# Patient Record
Sex: MALE | Race: WHITE | Employment: STUDENT | URBAN - METROPOLITAN AREA
[De-identification: names, ages, dates, MRNs, and addresses within clinical notes are randomized per-mention and may not be internally consistent; named-entity substitution may affect disease eponyms.]

---

## 2018-04-12 ENCOUNTER — TELEPHONE (OUTPATIENT)
Dept: OTOLARYNGOLOGY | Facility: CLINIC | Age: 23
End: 2018-04-12

## 2018-04-12 NOTE — TELEPHONE ENCOUNTER
FUTURE VISIT INFORMATION      FUTURE VISIT INFORMATION:    Date: 05/31/2018    Time: 8:30    Location: [facility] [place]  REFERRAL INFORMATION:    Referring provider:  N/A    Referring providers clinic:  N/A    Reason for visit/diagnosis  SPEECH THERAPY SESSIONS        RECORDS STATUS    ALL RECORDS IN [facility] SEEN DD IN THE PAST 2014    OFFICE VISIT: 12/26/2014, 12/23/2014, 12/18/2014 & 11/24/2014

## 2018-04-19 ENCOUNTER — PRE VISIT (OUTPATIENT)
Dept: OTOLARYNGOLOGY | Facility: CLINIC | Age: 23
End: 2018-04-19

## 2018-04-19 NOTE — TELEPHONE ENCOUNTER
FUTURE VISIT INFORMATION      FUTURE VISIT INFORMATION:    Date: 5/31/18    Time: 8:30am    Location: [facility] [place]  REFERRAL INFORMATION:    Referring provider:  None- previous Pt    Referring providers clinic:  [facility]    Reason for visit/diagnosis  requesting follow up from 2014    RECORDS REQUESTED FROM:       Clinic name Comments Records Status Imaging Status   [facility] Records are internal Internal                                    RECORDS STATUS

## 2018-05-31 ENCOUNTER — OFFICE VISIT (OUTPATIENT)
Dept: OTOLARYNGOLOGY | Facility: CLINIC | Age: 23
End: 2018-05-31
Payer: COMMERCIAL

## 2018-05-31 ENCOUNTER — PRE VISIT (OUTPATIENT)
Dept: OTOLARYNGOLOGY | Facility: CLINIC | Age: 23
End: 2018-05-31

## 2018-05-31 DIAGNOSIS — R49.0 DYSPHONIA: Primary | ICD-10-CM

## 2018-05-31 NOTE — PROGRESS NOTES
[facility] VOICE CLINIC  Jose Zapata Jr., M.D., F.A.C.S.  Kristi Maguire M.D., M.P.H.  Bailey Stockton, Ph.D., CCC/SLP  Antoinette Hernandez M.M. (voice), MRODNEY., CCC/SLP  Alistair Schultz M.M. (voice), MRODNEY., [facility]/SLP    [facility] VOICE [facility]  VOICE/SPEECH/BREATHING EVALUATION AND LARYNGEAL EXAMINATION REPORT    Patient: Farshad Taveras  Date of Visit: 5/31/2018    HISTORY  Farshad Taveras was seen for voice evaluation, laryngeal examination and treatment today.  He was last seen in December of 2014. Salient details of his history are as follows:    Mr. Taveras is a 22 year old University student with a history of dysphonia.    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 [employer]    Short course of therapy in December 2014 while on break from school    Sessions were helpful during the session, but it was difficult to maintain the practice and vigilance while he was at school    Some improvement at school, but also didn't notice as much, because he was becoming dissatisfied with his school program; left school later that spring    Day to day strain on voice was reduced when he left music school, so he thought he was getting better    Now using voice differently, but more problematic    He is playing trumpet much less, but there is actually more demand on his voice as 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     His school work also involves a great deal of discussion and giving of presentations    Feels that he has forgotten what it was like to talk normally, and now realizes that the voice problem is still a constant issue    Not particularly hoarse, but strain with voice use continues throughout the day; he finds he needs to reduce his voice use by the end of the day because of discomfort    Lack of control of voice; quality is unstable and irregular; never aphonic; rarely normal    Also throat-clearing, globus, throat muscle  "discomfort    Clicking sound/sensation on swallow    Throat and swallow symptoms are related to voice use, and covary with voice use    DIAGNOSIS/REASON FOR REFERRAL  Dysphonia/ Evaluate, perform laryngeal exam, treat as appropriate    Patient Supplied Answers To [facility] Intake General Questionnaire  [facility] Intake - General Form Review 5/31/2018   Are you having any of these symptoms? Increased effort to talk/sing, Throat irritation, Throat tightness, Pain/ache in throat, Mucus in throat, Frequent throat-clearing, Poor voice quality   Are you having any of these symptoms? Voice tires easily, Voice breaks, Change in vocal pitch, Change in vocal volume, Change in range   Do you use caffeine? Yes   If you do use caffeine, how many oz. do you typically drink in a day? 16   How many oz. of non-caffeinated fluid do you typically drink in a day? 32   Have reflux medications been recommended to you? No   If yes, are you taking them regularly? N/A   Voice: Yes   Swallowing: Yes   Cough and/or throat-clearing: Yes   Breathing problem: No   Throat discomfort and/or the feeling of a lump in your throat: Yes   A different problem, not listed above: No   Other physicians/health care providers who should receive a copy of today's note (please provide first and last name(s), city): Mariella Caraballo       Patient Supplied Answers To VHI Questionnaire  Voice Handicap Index (VHI-10) 5/31/2018   My voice makes it difficult for people to hear me 2   People have difficulty understanding me in a noisy room 2   My voice difficulties restrict my personal and social life.  3   I feel left out of conversations because of my voice 2   My voice problem causes me to lose income 1   I feel as though I have to strain to produce voice 2   The clarity of my voice is unpredictable 2   My voice problem upsets me 3   My voice makes me feel handicapped 3   People ask, \"What's wrong with your voice?\" 0   VHI-10 20       Patient Supplied Answers To " [facility] Intake Cough/Throat-Clearing Questionnaire  [facility] Intake - Cough/Throat-Clearing Review 5/31/2018   How long have you had this cough/throat-clearing problem? 3 yrs   Was there anything unusual about the time this cough/throat-clearing problem was first noticed (such as illness, accident, surgery, etc)? If yes, please describe.  You have 200 characters to respond.  We will ask for more detail at your visit. walking pneumonia   What do you think caused this cough/throat-clearing problem? same as voice   How quickly did the cough/throat-clearing problem develop? Gradually   How do the cough/throat-clearing symptoms vary? Worse in the AM, Worse after heavy voice use, Worse with stress, Worse with exertion, Most of the time   Is there a tickle in your throat prior to coughing or throat clearing? No   What % of the time do you feel like you can control the urge to cough? 75%   Do any of the following trigger your cough? Talking, Laughing, Stress   If you have other triggers for your cough or throat-clearing issue, please list them here. no   Did you receive any therapy or treatment for this cough/throat-clearing problem?  If so, please briefly describe. no   For your cough/throat-clearing problem, is there anything else you'd like to tell us? no       Patient Supplied Answers To CSI Questionnaire  Cough Severity Index (CSI) 11/24/2014   My cough is worse when I lie down 0   My coughing problem causes me to restrict my personal and social life 2   I tend to avoid places because of my cough problem 1   I feel embarrassed because of my coughing problem 2   People ask, ''What's wrong?'' because I cough a lot 2   I run out of air when I cough 0   My coughing problem affects my voice 2   My coughing problem limits my physical activity 3   My coughing problem upsets me 3   People ask me if I am sick because I cough a lot 3   CSI Score 18       Patient Supplied Answers To [facility] Intake Throat Discomfort Questionnaire  [facility]  "Intake - Throat Discomfort Review 5/31/2018   How long have you had this throat discomfort and/or lump in the throat problem? 3 yrs   Did anything unusual happen about the time the throat discomfort and/or lump in the throat problem was first noticed (such as illness, accident, surgery, etc.)? If yes, please describe.  You have 200 characters to respond.  We will ask for more detail at your visit. walking pneumonia   What do you think caused this throat discomfort and/or lump in the throat problem? walkibg pneumonia   How quickly did the throat discomfort and/or lump in your throat problem develop? Gradually   How do the throat discomfort and/or lump in the throat symptoms vary? Worse in the PM, Worse after heavy voice use, Worse wIth stress, Worse with exertion, Most of the time   Over time, how has the throat discomfort and/or lump in the throat problem changed? Same   Do you have throat pain? Yes   If you have throat pain, how does it feel? Aching   Do you have the feeling of a \"lump\" in your throat? No   How long do the throat discomfort and/or lump in the throat symptoms last? Always there   What health care providers have you seen for this throat discomfort and/or lump in your throat problem? Who and when? balwinder,3years ago   Did you receive any therapy or treatment for your throat discomfort and/or lump in the throat problem?  If so, please briefly describe. voice therapy   For your throat discomfort and/or lump in the throat problem, is there anything else you'd like to tell us? no       Patient Supplied Answers To [facility] Intake Swallow Questionnaire  [facility] Intake - Swallow Review 5/31/2018   How long have you had this swallowing problem? 3 yrs   How quickly did the swallowing problem develop? Gradually   Did any specific event(s) occur around the time the swallowing problem was first noticed (such as illness, accident, surgery, etc)?  If yes, please describe? You have 200 characters to respond.  We will ask " for more detail at your visit. same as voice   What do you think caused this swallowing problem? same as voice   Have you ever had a swallowing problem before?  If yes, when? no   Do you feel a lump in your throat? Yes, Sometimes   Please check all that apply when describing your swallowing issue: Worse after heavy voice use, Worse with stress   When you swallow, how often do things feel like they go down the wrong tube? Weekly   My swallowing issue is: Variable   Over time, how has the swallowing problem changed? Same   What is your current type of food intake? Regular   What is your current type of liquid intake? Regular - thin liquids   What health care providers have you seen for this swallowing problem?  Who and when? balwinder   Did you receive any therapy or treatment for this swallowing problem?  If so, please describe briefly. voicetherapy   Have you changed your diet because of your swallowing problem?  If yes, how? no   Have you had a prior swallow study? No   Have you had a pneumonia or other severe chest infection within the past 6 months? No   For your swallowing problem, is there anything else you'd like to tell us? no     Patient Supplied Answers To EAT Questionnaire  Eating Assessment Tool (EAT-10) 11/24/2014   My swallowing problem has caused me to lose weight 0   My swallowing problem interferes with my ability to go out for meals 0   Swallowing liquids takes extra effort 0   Swallowing solids takes extra effort 1   Swallowing pills takes extra effort 0   Swallowing is painful 1   The pleasure of eating is affected by my swallowing 0   When I swallow food sticks in my throat 1   I cough when I eat 0   Swallowing is stressful 3   EAT-10 6       CURRENT PATIENT COMPLAINTS    Primary: voice    Secondary: related concerns that covary with voice    Denies significant dyspnea, dysphagia    No sharp or sudden pain; it is a dull burning/aching    OTHER PERTINENT HISTORY    Depression for past three years,  "now \"getting on top of it\"    Otherwise healthy; no medications    OBJECTIVE FINDINGS  VOICE/ SPEECH/ NON-COMMUNICATIVE LARYNGEAL BEHAVIORS EVALUATION  An evaluation of the voice was accomplished today; salient features are as follows:    Palpation of the laryngeal area shows:    Firm musculature    Tenderness of the thyrohyoid area     Severely reduced thyrohyoid space     No additional closure of the thyrohyoid space is possible on phonation    Massage of the thyrohyoid area brings relief    Breathing pattern:    Good thoracic/abdominal muscle use pattern, but movement for inhalation seems restricted    No overt tension is evident.    Voice quality is characterized by    Clear, robust, resonant quality    Mildly to moderately pressed    \"valved,\" inadequate airflow    Habitual pitch is WNL at F#2 to G#2, it is unclear if this is optimal for him    He states this feels optimal, and that his pitch rises as he becomes more tense    Loudness is WNL and appropriate for the setting    Sustained vowels:     Comfortable pitch /a/: G2 - barely adequate breath; very mild roughness, especially at onset; slight instability    Comfortable pitch /i/: G2 - barely adequate breath; messier onset; slightly more unstable    A singing task shows voice quality is essentially consistent between speech and singing, with very mild roughness    Clearer in the higher pitches of Happy Birthday, around F#3    In a pitch glide task to determine pitch range, he demonstrates good technique for the task    Highest pitch: D5, falsetto    Lowest pitch: C#2    he states today is a typical voice day, but his voice can get worse with increased use    GLOBAL ASSESSMENT OF DYSPHONIA: 45 /100    CAPE-V Overall Severity:   12/100    LARYNGEAL EXAMINATION    Endoscopic laryngeal examination was performed by:  Bailey Stockton, Ph.D., CCC/SLP  Verbal informed consent was obtained and witnessed prior to this procedure.   Type of exam:   Flexible endoscopy " with chip-tip technology and stroboscopy, left nostril; topical anesthesia with 3% Lidocaine and 0.25% phenylephrine was applied.    This exam shows:    Laryngeal and Vocal Fold Mucosa    Essentially healthy laryngeal mucosa    mild presence of thickened white secretions on the vocal folds and throughout the laryngeal area    Vocal fold mucosa is   o mildly dry  o Very mildly erythematous or vascularized  o Otherwise within normal limits, with no lesions and straight vibratory margins    Neurological and Functional Integrity of the Larynx    Vocal folds are mobile and meet at midline; movement is brisk and symmetric; exam is neurologically normal     Slight fatigue and slightly resisted movement is evident during a task of 20 quickly repeated vowels    Elongation of the vocal folds for pitch increase is good    On phonation, glottic closure is pressed    Supraglottic Function and Therapy Probes    moderate four-way constriction of the supraglottic larynx during connected speech    Hyperadduction of the posterior glottis during phonation    Supraglottic function during singing is improved    Therapy probes show   o Reduction in supraglottic hyperfunction during tasks that involved word-initial labiodental fricatives with forward resonance maneuvers    Stroboscopy provided the following additional information:    The mucosal wave is within normal limits in periodicity, amplitude, symmetry, and phase  o Closed phase is long and predominant, with very brisk opening and closing  o Less pressed glottis (closed phase) with therapy probes    I reviewed this laryngeal exam with Mr. Taveras today, and I provided pertinent explanations, as well as written information:    Endoscopic findings are consistent with audio-perceptual assessment and patient Hx/complaints.    his symptoms are accounted for by muscular hyperfunction     Because there appears to be a functional component to his symptoms, he would most likely benefit from  "functional speech therapy.    THERAPEUTIC ACTIVITIES  Today Mr. Taveras participated in the following therapeutic activities:    Briefly reveiwed concepts of applying massage to the tight areas of his neck, in order to reduce pain.    Briefly reviewed concepts and techniques for improving topical and systemic hydration     Reviewed exercises for optimal respiratory mechanics for speech and singing.  o demonstrated abdominal relaxation for inhalation, but this was resisted somewhat    He realized the connection to his early trumpet technique that involved abdominal squeeze on phonation  o with guidance, learned improved abdominal relaxation for inhalation  o good learning, but will need practice    [place] exercises to add phonation to the optimal flowing airstream.  o Semi-occluded vocal tract exercises with a straw (\"cup and bubbles\") were most facilitating, and were revealing to him  o Instructed to use as an exercise for coordination of breath flow with phonation    [place] concepts of an optimal regimen for practice.  o he should use an interval schedule of practice, with brief periods of practice frequently throughout each day  o [place] concepts of volitional practice to facilitate motor learning.    I provided an after visit summary to help facilitate practice.    ASSESSMENT / PLAN    IMPRESSIONS: R49.0 (Dysphonia)     Laryngeal evaluation demonstrated muscular hyperfunction on phonation    Dysphonia is accounted for by the vocal fold lesions, and resulting aberrations in vibratory characteristics and mucosal wave    Dysphonia/discomfort is accounted for by the imbalanced function of the intrinsic and extrinsic laryngeal musculature    STIMULABILITY: results of therapy probes during perceptual and laryngeal evaluation demonstrate improvement with use of forward resonant stimuli  RECOMMENDATIONS:     A course of speech therapy is recommended to optimize vocal technique, improve voice quality and promote " reduced discomfort, effort and fatigue.    He demonstrates a Good prognosis for improvement given adherence to therapeutic recommendations. Therapeutic     Positive indicators: previous improvement, commitment to process    Negative indicators: none      We briefly began therapy today, working on strategies to improve vocal health and technique    TREATMENT PLAN  Speech therapy    DURATION/FREQUENCY OF TREATMENT  Two weekly, and two bi-weekly one-hour sessions, with four monthly one-hour follow-up sessions    GOALS  Patient goal:    To have a normal and acceptable voice quality and comfort for all his voice needs    Long-term goal(s): In 8 months, Mr. Taveras will:  1.  Report a week of typical vocal activities, in which dysphonia and discomfort do not exceed a level of 2 out of 10, 80% of the time   2.  Report a speaking and singing voice quality that is acceptable to him, 90%of the time  3.  In a 20-minute conversation task, demonstrate 90% accuracy with forward resonance/optimal breath flow, by therapist judgment    This treatment plan was developed with the patient who agreed with the recommendations.    PRIMARY ICD-10 code:  R49.0 (Dysphonia)    TOTAL SERVICE TIME: 120 minutes  EVALUATION OF VOICE AND RESONANCE: (63810): 75 minutes    TREATMENT (89345): 15 minutes  ENDOSCOPIC LARYNGEAL EXAMINATION WITH STROBOSCOPY (60703): 30 minutes  NO CHARGE FACILITY FEE (96096)      Bailey Stockton, Ph.D., [facility]-SLP  Speech-Language Pathologist  Director, [facility]  [phone]            Answers for HPI/ROS submitted by the patient on 5/31/2018   VPCMEAN: 2.25

## 2018-05-31 NOTE — PROGRESS NOTES
After Visit Summary    Patient: Farshad Taveras  Date of Visit: 5/31/2018    Hygiene:     Systemic Hydration: internal hydration of the entire body  o Pee pale, don't clear your throat      Topical Hydration: hydration for the surface mucosa of the larynx and vocal folds.  o Feel wet, don't clear you throat        Relieving Extrinsic Muscle Discomfort:    Ice and Heat  o Return to this; we will cover next time    Stretches  o     Massage  o     Splinting    Breathing:    Pay attention:    Breathing Tips:  o Take full breath, and let it out  o Get flabby     Voice:    Semi-occluded vocal tract exercise:   o Bubbles (straw in 1.5 to 2  of water) 100x/day for 1-2 min:  o 2-3x: blow 10-15 seconds with no voice and keep bubbles consistent.  o 3x: blow bubbles and add a sustained hhmmmmm  - Sense your voice in the straw, or in your mouth/face, not in your throat  o 3x: blow bubbles and vary  who  gliding up and down             Up and down like a siren  o 3x: blow bubbles on a sustained/ varied pitch softest to loudest to softest (messa di voce)    o 1-2x: Happy birthday bubbles (legato)  These exercises are great for:  *    Abdominal breathing and applying optimal breath flow to speech/singing.       Answers for HPI/ROS submitted by the patient on 5/31/2018   VPCMEAN: 2.25

## 2018-05-31 NOTE — MR AVS SNAPSHOT
After Visit Summary   5/31/2018    Farshad Taveras    MRN: [id number]           Patient Information     Date Of Birth          1995        Visit Information        Provider Department      5/31/2018 8:30 AM Bailey Stockton, SLP [facility] Voice        Today's Diagnoses     Dysphonia    -  1       Follow-ups after your visit        Who to contact     Please call your clinic at [phone] to:    Ask questions about your health    Make or cancel appointments    Discuss your medicines    Learn about your test results    Speak to your doctor            Additional Information About Your Visit        MyChart Information     [employer] gives you secure access to your electronic health record. If you see a primary care provider, you can also send messages to your care team and make appointments. If you have questions, please call your primary care clinic.  If you do not have a primary care provider, please call [phone] and they will assist you.      [employer] is an electronic gateway that provides easy, online access to your medical records. With [employer], you can request a clinic appointment, read your test results, renew a prescription or communicate with your care team.     To access your existing account, please contact your [facility] or call [phone] for assistance.        Care EveryWhere ID     This is your Care EveryWhere ID. This could be used by other organizations to access your [place] medical records  [id number]         Blood Pressure from Last 3 Encounters:   07/05/16 142/76   08/17/15 145/85   10/13/14 123/80    Weight from Last 3 Encounters:   07/05/16 73.9 kg (163 lb)   08/17/15 74.8 kg (165 lb)   10/13/14 73.5 kg (162 lb)              We Performed the Following     C BEHAVIORAL & QUALITATIVE ANALYSIS VOICE AND RESONANCE     IMAGESTREAM RECORDING ORDER     LARYNGOSCOPY FLEX/RIGID W STROBOSCOPY     SPEECH/HEARING THERAPY, INDIVIDUAL        Primary  Care Provider Office Phone # Fax #    Danie Corey [phone]       [address] [place]   [facility] [address]        Equal Access to Services     NELLY SALCIDO : Cheyanne elijah ku tanjao Soomaali, waaxda luqadaha, qaybta kaalmada adeegyada, sheyla clementedannielle coyle. So [facility] [phone].    ATENCIÓN: Si habla español, tiene a martínez disposición servicios gratuitos de asistencia lingüística. Llame al [phone].    We comply with applicable federal civil rights laws and [place] laws. We do not discriminate on the basis of race, color, national origin, age, disability, sex, sexual orientation, or gender identity.            Thank you!     Thank you for choosing [facility]  for your care. Our goal is always to provide you with excellent care. Hearing back from our patients is one way we can continue to improve our services. Please take a few minutes to complete the written survey that you may receive in the mail after your visit with us. Thank you!             Your Updated Medication List - Protect others around you: Learn how to safely use, store and throw away your medicines at www.disposemymeds.org.          This list is accurate as of 5/31/18 11:59 PM.  Always use your most recent med list.                   Brand Name Dispense Instructions for use Diagnosis    sulfamethoxazole-trimethoprim 800-160 MG per tablet    BACTRIM DS/SEPTRA DS    30 tablet    Take 1 tablet by mouth 2 times daily For 5 days as needed for MRSA infection.    MRSA carrier

## 2018-06-22 ENCOUNTER — TELEPHONE (OUTPATIENT)
Dept: OTOLARYNGOLOGY | Facility: CLINIC | Age: 23
End: 2018-06-22

## 2018-07-19 ENCOUNTER — OFFICE VISIT (OUTPATIENT)
Dept: OTOLARYNGOLOGY | Facility: CLINIC | Age: 23
End: 2018-07-19
Payer: COMMERCIAL

## 2018-07-19 DIAGNOSIS — R49.0 DYSPHONIA: Primary | ICD-10-CM

## 2018-07-19 ASSESSMENT — PATIENT HEALTH QUESTIONNAIRE - PHQ9
SUM OF ALL RESPONSES TO PHQ QUESTIONS 1-9: 14
10. IF YOU CHECKED OFF ANY PROBLEMS, HOW DIFFICULT HAVE THESE PROBLEMS MADE IT FOR YOU TO DO YOUR WORK, TAKE CARE OF THINGS AT HOME, OR GET ALONG WITH OTHER PEOPLE: VERY DIFFICULT
SUM OF ALL RESPONSES TO PHQ QUESTIONS 1-9: 14

## 2018-07-19 NOTE — PROGRESS NOTES
"[facility] VOICE [facility]  Jose Zapata Jr., M.D., F.A.C.S.  Kristi Maguire M.D., M.P.H.  Bailey Stockton, Ph.D., CCC/SLP  Antoinette Hernandez M.M. (voice), M.ROXIE., CCC/SLP  Alistair Schultz M.M. (voice), MRODNEY., [facility]/SLP    [facility]  VOICE/SPEECH/BREATHING THERAPY PROGRESS REPORT    Patient: Farshad Taveras  Date of Service: 7/19/2018  Date of Last Service: 5/31/18    I had the pleasure of seeing Mr. Taveras today, for the 2nd  therapy session (CPT code 01610).  He was referred for medically necessary speech therapy to address a diagnosis of:  R49.0 (Dysphonia)   Please refer to his initial evaluation report of 5/31/18 for complete details regarding diagnostic findings.    PROGRESS SINCE LAST SESSION  Mr. Taveras was seen for evaluation and the beginning of voice therapy on 5/31.  At that time, it was determined that he would benefit from a course of speech therapy to address the above diagnosis.  Some therapeutic intervention was provided at the time.    Regarding practice, Mr. Taveras reports the following:     He tried the exercises, but they were more effective here in the office than at home    voice is slightly improved during the straw exercises, but minimal carryover to spontaneous conversation    he does not entirely understand how to do the exercises, and would like clarification    He has used heat on his neck, but hasn't tried ice; heat is a little helpful    Mr. Taveras also states that:    Nothing else has changed    He loses quite a bit of the low end of his pitch range by the end of the day    He also notes less control of his voice in speaking or singing; the \"consistent richness my voice used to have is less consistent now\"     Mr. Taveras presents today with the following:  Voice quality:    Mild to moderate pressed quality    Much better airflow and relaxation in spontaneous laughter    Pitch is at G2    THERAPEUTIC ACTIVITIES  Today Mr. Taveras participated in the following therapeutic " "activities:    [place] concepts of applying ice, heat, massage, and splinting to the tender areas of his neck, in order to reduce pain.  o He will use ice as well as heat  o He tried the splinting and found it useful  o We reviewed the massage strategies, and he now has a better understanding of how to use it effectively      Demonstrated previous exercises.  o Too much abdominal contraction and neck, lip, and jaw tension  o appropriate redirection provided; we eventually abandoned in favor of other exercises    Straw exercises too much like trumpet playing, which has been problematic    [place] exercises for optimal respiratory mechanics for speech and singing.  o I provided explanation of the anatomy and physiology of respiration for speech and singing; he found this to be helpful  o he demonstrated clavicular/neck/shoulder involvement in inhalation  o Tends to be \"hypervigilant\" in his concentration during breathing exercises  o demonstrated difficulty allowing abdominal relaxation for inhalation  o with guidance, learned improved abdominal relaxation for inhalation  o learned techniques for optimal airflow on exhalation; this required practice and concentration  o good learning, but will need practice    [place] exercises to add phonation to the optimal flowing airstream.  o Nasal continuants (/m/) were most facilitating  o This felt much better to him, and was more facilitating than the straw  o [place] a regimen of all these exercises to improve coordination of respiration and phonation  o good learning, but will need practice     [place] concepts of an optimal regimen for practice.  o he should use an interval schedule of practice, with brief periods of practice frequently throughout each day  o [place] concepts of volitional practice to facilitate motor learning.    IMPRESSIONS/GOALS/PLAN  Mr. Taveras had a productive session of therapy today, working on techniques/strategies/exercises that will help him " achieve his goal of reducing  R49.0 (Dysphonia)   And achieving acceptable and comfortable voice use, allowing him to meet personal and professional vocal demands and fully engage in activities of daily living.   He will continue to work on his exercises on a daily basis, and work on incorporating the techniques into his daily vocal activities.    Goals for this practice period:     practice all exercises according to instructions    incorporate techniques into daily vocal activities    Plan: I will see Mr. Taveras in 2 weeks to work on education, modification, and carryover of therapeutic activities to more complex phonatory tasks.    PRIMARY ICD-10 code:  R49.0 (Dysphonia)    TOTAL SERVICE TIME: 60 minutes  TREATMENT (31737): 60 minutes  NO CHARGE FACILITY FEE (60028)    Bailey Stockton, Ph.D., [facility]SLP  Speech-Language Pathologist  Director, [facility]  [phone]                Answers for HPI/ROS submitted by the patient on 7/19/2018   PHQ-2 Score: 4  If you checked off any problems, how difficult have these problems made it for you to do your work, take care of things at home, or get along with other people?: Very difficult  PHQ9 TOTAL SCORE: 14

## 2018-07-19 NOTE — MR AVS SNAPSHOT
After Visit Summary   7/19/2018    Farshad Taveras    MRN: [id number]           Patient Information     Date Of Birth          1995        Visit Information        Provider Department      7/19/2018 9:30 AM Bailey Stockton SLP [facility]        Today's Diagnoses     Dysphonia    -  1       Follow-ups after your visit        Your next 10 appointments already scheduled     Jul 30, 2018  8:30 AM CDT   (Arrive by 8:15 AM)   RETURN SLP VOICE with ADRIANO Messer [facility] ([facility])    [address] [place] [address] [phone]              Who to contact     Please call your clinic at [phone] to:    Ask questions about your health    Make or cancel appointments    Discuss your medicines    Learn about your test results    Speak to your doctor            Additional Information About Your Visit        MyChart Information     [employer] gives you secure access to your electronic health record. If you see a primary care provider, you can also send messages to your care team and make appointments. If you have questions, please call your primary care clinic.  If you do not have a primary care provider, please call [phone] and they will assist you.      [employer] is an electronic gateway that provides easy, online access to your medical records. With [employer], you can request a clinic appointment, read your test results, renew a prescription or communicate with your care team.     To access your existing account, please contact your [facility] or call [phone] for assistance.        Care EveryWhere ID     This is your Care EveryWhere ID. This could be used by other organizations to access your [place] medical records  [id number]         Blood Pressure from Last 3 Encounters:   07/05/16 142/76   08/17/15 145/85   10/13/14 123/80    Weight from Last 3 Encounters:   07/05/16 73.9 kg (163  lb)   08/17/15 74.8 kg (165 lb)   10/13/14 73.5 kg (162 lb)              We Performed the Following     SPEECH/HEARING THERAPY, INDIVIDUAL        Primary Care Provider Office Phone # Fax #    Danie Corey [phone]       [address]   [facility] [address]        Equal Access to Services     [facility]: Hadii aad ku hadasho Soomaali, waaxda luqadaha, qaybta kaalmada adeegyada, waxay idiin hayaan adeeg khkelbysh la'aan ah. So [facility] [phone].    ATENCIÓN: Si habla español, tiene a martínez disposición servicios gratuitos de asistencia lingüística. Edmond al [phone].    We comply with applicable federal civil rights laws and [place] laws. We do not discriminate on the basis of race, color, national origin, age, disability, sex, sexual orientation, or gender identity.            Thank you!     Thank you for choosing  [employer] [facility]ICE  for your care. Our goal is always to provide you with excellent care. Hearing back from our patients is one way we can continue to improve our services. Please take a few minutes to complete the written survey that you may receive in the mail after your visit with us. Thank you!             Your Updated Medication List - Protect others around you: Learn how to safely use, store and throw away your medicines at www.disposemymeds.org.          This list is accurate as of 7/19/18 11:59 PM.  Always use your most recent med list.                   Brand Name Dispense Instructions for use Diagnosis    sulfamethoxazole-trimethoprim 800-160 MG per tablet    BACTRIM DS/SEPTRA DS    30 tablet    Take 1 tablet by mouth 2 times daily For 5 days as needed for MRSA infection.    MRSA carrier

## 2018-07-20 ASSESSMENT — PATIENT HEALTH QUESTIONNAIRE - PHQ9: SUM OF ALL RESPONSES TO PHQ QUESTIONS 1-9: 14

## 2018-07-30 ENCOUNTER — OFFICE VISIT (OUTPATIENT)
Dept: OTOLARYNGOLOGY | Facility: CLINIC | Age: 23
End: 2018-07-30
Payer: COMMERCIAL

## 2018-07-30 DIAGNOSIS — R49.0 DYSPHONIA: Primary | ICD-10-CM

## 2018-07-30 NOTE — PROGRESS NOTES
[facility] VOICE [facility]  Jose Zapata Jr., M.D., F.A.C.S.  Kristi Maguire M.D., M.P.H.  Bailey Stockton, Ph.D., CCC/SLP  Antoinette Hernandez M.M. (voice), M.ROXIE., CCC/SLP  Alistair Schultz M.M. (voice), M.A., [facility]SLP    [facility] VOICE [facility]  VOICE/SPEECH/BREATHING THERAPY PROGRESS REPORT    Patient: Farshad Taveras  Date of Service: 7/30/2018  Date of Last Service: 7/19/18    I had the pleasure of seeing Mr. Taveras today, for the 3rd therapy session (CPT code 79301).  He is self-referred for medically necessary speech therapy to address a diagnosis of:  R49.0 (Dysphonia)   Please refer to his initial evaluation report of 5/31/18 for complete details regarding diagnostic findings.    PROGRESS SINCE LAST SESSION  At the last session, Mr. Taveras worked on therapeutic activities to improve his voice quality and comfort, allowing  him to meet personal and professional vocal demands and fully engage in activities of daily living.    Regarding practice, Mr. Taveras reports the following:     regular practice of the massage exercises and humming    These exercises help reduce discomfort, but there is no carryover to speech    He uses splinting while doing the humming exercise    Mr. Taveras also states that:    The lack of progress in speech is disheartening; speaking is still quite painful    There is a fair amount of voice use at his job as a     He hums and sings throughout the day, and wonders if he should decrease this activity     Mr. Taveras presents today with the following:  Voice quality:    Clear, resonant, only mildly pressed    Fast, clipped speech; sometimes lack of airflow    Pitch is at D2 to F2; he states his pitch gets higher later in the day, which tends to be more comfortable    THERAPEUTIC ACTIVITIES  Today Mr. Taveras participated in the following therapeutic activities:    I provided instruction for decreasing his incidental humming and singing until he can do it in such a way as to help set him up  to use his voice more optimally in conversation.    Demonstrated previous exercises.  o demonstrated improved technique  o Quality is gentle and sometimes breathy  o There is still some chest elevation for inhalation  o Appropriate redirection provided for improved abdominal breathing  o instruction provided for increased level of complexity/difficulty; see below    [place] exercises to experience a more forward sensation during phonation.  o speech material with nasal continuants (/n/) was facilitating  o able to recognize improvement in quality and comfort  o able to progress to level of a simple Q&A task, and reciting lines he uses at work  o good learning, but will need practice    [place] techniques to use an optimal pitch range in speech.  o today s pitch range: A2 to C#3  o able to maintain in the above exercises  o These exercises provided much better comfort; he was surprised and pleased by the difference    Reviewed concepts of an optimal regimen for practice.  o he should use an interval schedule of practice, with brief periods of practice frequently throughout each day  o [place] concepts of volitional practice to facilitate motor learning.    He had very good understanding of the exercises, and did not need an audio recording     IMPRESSIONS/GOALS/PLAN  Mr. Taveras had a very productive session of therapy today, working on techniques/strategies/exercises that will help him achieve his goal of reducing  R49.0 (Dysphonia)   And achieving acceptable and comfortable voice use, allowing him to meet personal and professional vocal demands and fully engage in activities of daily living.   He will continue to work on his exercises on a daily basis, and work on incorporating the techniques into his daily vocal activities.    Goals for this practice period:     practice all exercises according to instructions    incorporate techniques into daily vocal activities    maintain vigilance for vocal technique    Plan:  I will see Mr. Taveras in two weeks to work on education, modification, and carryover of therapeutic activities to more complex phonatory tasks.    PRIMARY ICD-10 code:  R49.0 (Dysphonia)    TOTAL SERVICE TIME: 60 minutes  TREATMENT (76539): 60 minutes  NO CHARGE FACILITY FEE (11265)    Bailey Stockton, Ph.D., [facility]-SLP  Speech-Language Pathologist  Director, [facility]  [phone]

## 2018-07-30 NOTE — MR AVS SNAPSHOT
After Visit Summary   7/30/2018    Farshad Taveras    MRN: [id number]           Patient Information     Date Of Birth          1995        Visit Information        Provider Department      7/30/2018 8:30 AM Bailey Stockton, [facility]LP [facility]        Today's Diagnoses     Dysphonia    -  1       Follow-ups after your visit        Who to contact     Please call your clinic at [phone] to:    Ask questions about your health    Make or cancel appointments    Discuss your medicines    Learn about your test results    Speak to your doctor            Additional Information About Your Visit        MyChart Information     [employer] gives you secure access to your electronic health record. If you see a primary care provider, you can also send messages to your care team and make appointments. If you have questions, please call your primary care clinic.  If you do not have a primary care provider, please call [phone] and they will assist you.      [employer] is an electronic gateway that provides easy, online access to your medical records. With [employer], you can request a clinic appointment, read your test results, renew a prescription or communicate with your care team.     To access your existing account, please contact your [facility] or call [phone] for assistance.        Care EveryWhere ID     This is your Care EveryWhere ID. This could be used by other organizations to access your [place] medical records  [id number]         Blood Pressure from Last 3 Encounters:   07/05/16 142/76   08/17/15 145/85   10/13/14 123/80    Weight from Last 3 Encounters:   07/05/16 73.9 kg (163 lb)   08/17/15 74.8 kg (165 lb)   10/13/14 73.5 kg (162 lb)              We Performed the Following     SPEECH/HEARING THERAPY, INDIVIDUAL        Primary Care Provider Office Phone # Fax #    Danie Corey -[phone]       [address]    [facility] [address]        Equal Access to Services     [facility] : Hadii aad ku hadnuviajeff Isaiasali, waallida luisidroayanha, yamel madisongallitosheyla haddadkelbyagustina coyle. So [facility] [phone].    ATENCIÓN: Si habla español, tiene a martínez disposición servicios gratuitos de asistencia lingüística. Llame al [phone].    We comply with applicable federal civil rights laws and [place] laws. We do not discriminate on the basis of race, color, national origin, age, disability, sex, sexual orientation, or gender identity.            Thank you!     Thank you for choosing [facility]  for your care. Our goal is always to provide you with excellent care. Hearing back from our patients is one way we can continue to improve our services. Please take a few minutes to complete the written survey that you may receive in the mail after your visit with us. Thank you!             Your Updated Medication List - Protect others around you: Learn how to safely use, store and throw away your medicines at www.disposemymeds.org.          This list is accurate as of 7/30/18 11:59 PM.  Always use your most recent med list.                   Brand Name Dispense Instructions for use Diagnosis    sulfamethoxazole-trimethoprim 800-160 MG per tablet    BACTRIM DS/SEPTRA DS    30 tablet    Take 1 tablet by mouth 2 times daily For 5 days as needed for MRSA infection.    MRSA carrier

## 2018-08-17 ENCOUNTER — OFFICE VISIT (OUTPATIENT)
Dept: OTOLARYNGOLOGY | Facility: CLINIC | Age: 23
End: 2018-08-17
Payer: COMMERCIAL

## 2018-08-17 DIAGNOSIS — R49.0 DYSPHONIA: Primary | ICD-10-CM

## 2018-08-17 NOTE — MR AVS SNAPSHOT
After Visit Summary   8/17/2018    Farshad Taveras    MRN: [id number]           Patient Information     Date Of Birth          1995        Visit Information        Provider Department      8/17/2018 2:30 PM Bailey Stockton SLP [facility]        Today's Diagnoses     Dysphonia    -  1       Follow-ups after your visit        Your next 10 appointments already scheduled     Sep 11, 2018  8:30 AM CDT   (Arrive by 8:15 AM)   RETURN SLP VOICE with ADRIANO Messer [facility] ([facility])    [address] [place] [address] [phone]              Who to contact     Please call your clinic at [phone] to:    Ask questions about your health    Make or cancel appointments    Discuss your medicines    Learn about your test results    Speak to your doctor            Additional Information About Your Visit        MyChart Information     [employer] gives you secure access to your electronic health record. If you see a primary care provider, you can also send messages to your care team and make appointments. If you have questions, please call your primary care clinic.  If you do not have a primary care provider, please call [phone] and they will assist you.      [employer] is an electronic gateway that provides easy, online access to your medical records. With [employer], you can request a clinic appointment, read your test results, renew a prescription or communicate with your care team.     To access your existing account, please contact your [facility] or call [phone] for assistance.        Care EveryWhere ID     This is your Care EveryWhere ID. This could be used by other organizations to access your [place] medical records  [id number]         Blood Pressure from Last 3 Encounters:   07/05/16 142/76   08/17/15 145/85   10/13/14 123/80    Weight from Last 3 Encounters:   07/05/16 73.9 kg (163  lb)   08/17/15 74.8 kg (165 lb)   10/13/14 73.5 kg (162 lb)              We Performed the Following     SPEECH/HEARING THERAPY, INDIVIDUAL        Primary Care Provider Office Phone # Fax #    Danie Corey [phone]       [address]   [facility] [address]        Equal Access to Services     [facility]: Hadii aad ku hadasho Soomaali, waaxda luqadaha, qaybta kaalmada adeegyada, waxay idiin hayaan adeeg khkelbysh la'aan ah. So [facility] [phone].    ATENCIÓN: Si habla español, tiene a martínez disposición servicios gratuitos de asistencia lingüística. Edmond al [phone].    We comply with applicable federal civil rights laws and [place] laws. We do not discriminate on the basis of race, color, national origin, age, disability, sex, sexual orientation, or gender identity.            Thank you!     Thank you for choosing  [facility]  for your care. Our goal is always to provide you with excellent care. Hearing back from our patients is one way we can continue to improve our services. Please take a few minutes to complete the written survey that you may receive in the mail after your visit with us. Thank you!             Your Updated Medication List - Protect others around you: Learn how to safely use, store and throw away your medicines at www.disposemymeds.org.          This list is accurate as of 8/17/18 11:59 PM.  Always use your most recent med list.                   Brand Name Dispense Instructions for use Diagnosis    sulfamethoxazole-trimethoprim 800-160 MG per tablet    BACTRIM DS/SEPTRA DS    30 tablet    Take 1 tablet by mouth 2 times daily For 5 days as needed for MRSA infection.    MRSA carrier

## 2018-08-17 NOTE — PROGRESS NOTES
"[facility] VOICE [facility]  Jose Zapata Jr., M.D., F.A.C.S.  Kristi Maguire M.D., M.P.H.  Bailey Stockton, Ph.D., CCC/SLP  Antoinette Hernandez M.M. (voice), M.ROXIE., CCC/SLP  Alistair Schultz M.M. (voice), MRODNEY., [facility]/SLP    [facility] VOICE [facility]  VOICE/SPEECH/BREATHING THERAPY PROGRESS REPORT    Patient: Farshad Taveras  Date of Service: 8/17/2018  Date of Last Service: 7/30/18    I had the pleasure of seeing Mr. Taveras today, for the 4th therapy session (CPT code 82159).  He is self-referred for medically necessary speech therapy to address a diagnosis of:  R49.0 (Dysphonia)   Please refer to his initial evaluation report of 5/31/18 for complete details regarding diagnostic findings.    PROGRESS SINCE LAST SESSION  At the last session, Mr. Taveras worked on therapeutic activities to improve his voice quality and comfort, allowing  him to meet personal and professional vocal demands and fully engage in activities of daily living.    Regarding practice, Mr. Taveras reports the following:     regular frequent practice for short periods  o He does short bursts of practice before using his voice    the recording is helpful to find the optimal pitch range    He is trying to find the natural resonance and pitch    The exercises help set him up to use new techniques in daily conversation  o It is not habitual yet, but he is finding a new place for his voice (face and mouth, instead of throat)  o It is hard to maintain, because it requires so much concentration    He has not done much ice, heat, and massage, except when he is already in pain    Mr. Taveras also states that:    The first week and a half after last session was uncomfortable, because the pain changed place  o The pain was less, but it felt unusual    More recently he feels that he is \"narrowing in\" on the new technique, so that the pain is less severe, and more focused in the thyrohyoid area, instead of the whole throat and chest     Mr. Taveras presents today with the " following:  Voice quality:    Resonant and clear    Somewhat less backward focus as he tries to bring voice forward    Pitch is at F2 to C3; often staying a bit lower than optimal    THERAPEUTIC ACTIVITIES  Today Mr. Taveras participated in the following therapeutic activities:    I reiterated the need for ice, heat, massage, and splinting throughout each day, as opposed to just in response to already-established pain    Adrian exercises to experience a more forward sensation during phonation.  o speech material that elicits a high, forward tongue position was facilitating  o Worked in both chant and speech modes  o Concentrated on maintaining forward focus while descending in pitch  o Helped by splinting the muscles during the exercises  o able to recognize improvement in quality and comfort  o Good awareness and learning; will need practice    Reviewed concepts of an optimal regimen for practice.  o he should use an interval schedule of practice, with brief periods of practice frequently throughout each day  o Adrian concepts of volitional practice to facilitate motor learning.    An audio recording of today's therapeutic activities was provided, to facilitate practice.    IMPRESSIONS/GOALS/PLAN  Mr. Taveras had another productive session of therapy today, working on techniques/strategies/exercises that will help him achieve his goal of reducing  R49.0 (Dysphonia)   And achieving acceptable and comfortable voice use, allowing him to meet personal and professional vocal demands and fully engage in activities of daily living.   He will continue to work on his exercises on a daily basis, and work on incorporating the techniques into his daily vocal activities.    Goals for this practice period:     practice all exercises according to instructions    incorporate techniques into daily vocal activities    maintain vigilance for vocal technique    Plan: I will see Mr. Taveras in three weeks to work on education,  modification, and carryover of therapeutic activities to more complex phonatory tasks.    PRIMARY ICD-10 code:  R49.0 (Dysphonia)    TOTAL SERVICE TIME: 60 minutes  TREATMENT (16521): 60 minutes  NO CHARGE FACILITY FEE (86419)    Bailey Stockton, Ph.D., [facility]-SLP  Speech-Language Pathologist  Director, [facility]  [phone]

## 2018-09-11 ENCOUNTER — OFFICE VISIT (OUTPATIENT)
Dept: OTOLARYNGOLOGY | Facility: CLINIC | Age: 23
End: 2018-09-11
Payer: COMMERCIAL

## 2018-09-11 DIAGNOSIS — R49.0 DYSPHONIA: Primary | ICD-10-CM

## 2018-09-11 NOTE — PROGRESS NOTES
[facility] VOICE [facility]  Jose Zapata Jr., M.D., F.A.C.S.  Kristi Maguire M.D., M.P.H.  Bailey Stockton, Ph.D., CCC/SLP  Antoinette Hernandez M.M. (voice), M.A., CCC/SLP  Alistair Schultz M.M. (voice), M.A., [facility]/SLP    [facility] VOICE [facility]  VOICE/SPEECH/BREATHING THERAPY PROGRESS REPORT    Patient: Farshad Taveras  Date of Service: 9/11/2018  Date of Last Service: 8/17/18    I had the pleasure of seeing Mr. Taveras today, for the 5th therapy session (CPT code 79445).  He is self-referred for medically necessary speech therapy to address a diagnosis of:  R49.0 (Dysphonia)   Please refer to his initial evaluation report of 5/31/18 for complete details regarding diagnostic findings.    PROGRESS SINCE LAST SESSION  At the last session, Mr. Taveras worked on therapeutic activities to improve his voice quality and comfort, allowing  him to meet personal and professional vocal demands and fully engage in activities of daily living.    Regarding practice, Mr. Taveras reports the following:     Regular frequent practice with and without the recording  o He thinks he should practice more    the recording is helpful    voice is improved during the exercises, but carryover to spontaneous conversation is problematic  o He often has to talk loudly, and this is different from how he does exercises  o There is more stress now that school has started, and this affects him muscularly    he experiences improvement in fatigue and discomfort while doing the exercises    Mr. Taveras also states that:    He is a little bit better, but carryover is problematic    The ice, heat, and massage strategies are helpful now that he does them more       Mr. Taveras presents today with the following:  Voice quality:    Mild backward focus; inconsistent    Mild pressed quality    Pitch is at Ab2 to C3, essentially optimal    THERAPEUTIC ACTIVITIES  Today Mr. Taveras participated in the following therapeutic activities:    Bolt exercises for upper body  relaxation during phonation.  o demonstrated moderate upper body tension  o good self-awareness and understanding of the exercises  o improvement in voice comfort during exercises    Topstone exercises to improve volume through a more forward placement during phonation.  o speech material that elicits a high, forward tongue position was facilitating  o Worked at elongating vowels for better perception of loudness and increased intelligibility over noise  o able to recognize how this can help him at work  o good learning, but will need practice    Topstone a regimen for optimal warm-up and cool-down.  o Understands how this will be helpful to him    IMPRESSIONS/GOALS/PLAN  Mr. Taveras had a productive session of therapy today, working on techniques/strategies/exercises that will help him achieve his goal of reducing  R49.0 (Dysphonia)   And achieving acceptable and comfortable voice use, allowing him to meet personal and professional vocal demands and fully engage in activities of daily living.   He will continue to work on his exercises on a daily basis, and work on incorporating the techniques into his daily vocal activities.    Goals for this practice period:     practice all exercises according to instructions    incorporate techniques into daily vocal activities    maintain vigilance for vocal technique    Plan: I will see Mr. Taveras in a month to work on education, modification, and carryover of therapeutic activities to more complex phonatory tasks.    PRIMARY ICD-10 code:  R49.0 (Dysphonia)    TOTAL SERVICE TIME: 75 minutes  TREATMENT (87207): 75 minutes  NO CHARGE FACILITY FEE (21507)    Bailey Stockton, Ph.D., [facility]-SLP  Speech-Language Pathologist  Director, [facility]  [phone]

## 2018-09-11 NOTE — MR AVS SNAPSHOT
After Visit Summary   9/11/2018    Farshad Taveras    MRN: [id number]           Patient Information     Date Of Birth          1995        Visit Information        Provider Department      9/11/2018 8:30 AM Bailey Stockton, SLP [facility]        Today's Diagnoses     Dysphonia    -  1       Follow-ups after your visit        Who to contact     Please call your clinic at [phone] to:    Ask questions about your health    Make or cancel appointments    Discuss your medicines    Learn about your test results    Speak to your doctor            Additional Information About Your Visit        MyChart Information     [facility] gives you secure access to your electronic health record. If you see a primary care provider, you can also send messages to your care team and make appointments. If you have questions, please call your primary care clinic.  If you do not have a primary care provider, please call [phone] and they will assist you.      [facility] is an electronic gateway that provides easy, online access to your medical records. With [facility], you can request a clinic appointment, read your test results, renew a prescription or communicate with your care team.     To access your existing account, please contact your [facility] or call [phone] for assistance.        Care EveryWhere ID     This is your Care EveryWhere ID. This could be used by other organizations to access your [facility] medical records  [id number]         Blood Pressure from Last 3 Encounters:   07/05/16 142/76   08/17/15 145/85   10/13/14 123/80    Weight from Last 3 Encounters:   07/05/16 73.9 kg (163 lb)   08/17/15 74.8 kg (165 lb)   10/13/14 73.5 kg (162 lb)              We Performed the Following     SPEECH/HEARING THERAPY, INDIVIDUAL        Primary Care Provider Office Phone # Fax #    Danie Corey [phone]       [address] [place]    [facility] [address]        Equal Access to Services     [facility] : Hadii aad ku hadnuviajeff Isaiasali, waallida luisidroayanha, yamel madisongallitosheyla haddadkelbyagustina coyle. So [facility] [phone].    ATENCIÓN: Si habla español, tiene a martínez disposición servicios gratuitos de asistencia lingüística. Llame al [phone].    We comply with applicable federal civil rights laws and [place] laws. We do not discriminate on the basis of race, color, national origin, age, disability, sex, sexual orientation, or gender identity.            Thank you!     Thank you for choosing [facility]  for your care. Our goal is always to provide you with excellent care. Hearing back from our patients is one way we can continue to improve our services. Please take a few minutes to complete the written survey that you may receive in the mail after your visit with us. Thank you!             Your Updated Medication List - Protect others around you: Learn how to safely use, store and throw away your medicines at www.disposemymeds.org.          This list is accurate as of 9/11/18 11:59 PM.  Always use your most recent med list.                   Brand Name Dispense Instructions for use Diagnosis    sulfamethoxazole-trimethoprim 800-160 MG per tablet    BACTRIM DS/SEPTRA DS    30 tablet    Take 1 tablet by mouth 2 times daily For 5 days as needed for MRSA infection.    MRSA carrier

## 2019-10-04 ENCOUNTER — HEALTH MAINTENANCE LETTER (OUTPATIENT)
Age: 24
End: 2019-10-04

## 2020-11-14 ENCOUNTER — HEALTH MAINTENANCE LETTER (OUTPATIENT)
Age: 25
End: 2020-11-14

## 2021-09-12 ENCOUNTER — HEALTH MAINTENANCE LETTER (OUTPATIENT)
Age: 26
End: 2021-09-12

## 2022-01-02 ENCOUNTER — HEALTH MAINTENANCE LETTER (OUTPATIENT)
Age: 27
End: 2022-01-02

## 2022-11-19 ENCOUNTER — HEALTH MAINTENANCE LETTER (OUTPATIENT)
Age: 27
End: 2022-11-19

## 2023-04-09 ENCOUNTER — HEALTH MAINTENANCE LETTER (OUTPATIENT)
Age: 28
End: 2023-04-09